# Patient Record
Sex: FEMALE | Race: WHITE | NOT HISPANIC OR LATINO | Employment: STUDENT | URBAN - METROPOLITAN AREA
[De-identification: names, ages, dates, MRNs, and addresses within clinical notes are randomized per-mention and may not be internally consistent; named-entity substitution may affect disease eponyms.]

---
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
## 2024-01-05 ENCOUNTER — LAB (OUTPATIENT)
Dept: LAB | Facility: LAB | Age: 19
End: 2024-01-05
Payer: COMMERCIAL

## 2024-01-05 DIAGNOSIS — R79.0 LOW SERUM FERRITIN LEVEL: ICD-10-CM

## 2024-01-05 DIAGNOSIS — F43.23 ADJUSTMENT REACTION WITH ANXIETY AND DEPRESSION: ICD-10-CM

## 2024-01-05 DIAGNOSIS — E03.9 HYPOTHYROIDISM, UNSPECIFIED TYPE: ICD-10-CM

## 2024-01-05 LAB
ALBUMIN SERPL BCP-MCNC: 4.7 G/DL (ref 3.4–5)
ALP SERPL-CCNC: 49 U/L (ref 33–110)
ALT SERPL W P-5'-P-CCNC: 8 U/L (ref 7–45)
ANION GAP SERPL CALC-SCNC: 10 MMOL/L (ref 10–20)
AST SERPL W P-5'-P-CCNC: 12 U/L (ref 9–39)
BASOPHILS # BLD AUTO: 0.06 X10*3/UL (ref 0–0.1)
BASOPHILS NFR BLD AUTO: 1.5 %
BILIRUB SERPL-MCNC: 0.5 MG/DL (ref 0–1.2)
BUN SERPL-MCNC: 8 MG/DL (ref 6–23)
CALCIUM SERPL-MCNC: 9.3 MG/DL (ref 8.6–10.3)
CHLORIDE SERPL-SCNC: 103 MMOL/L (ref 98–107)
CO2 SERPL-SCNC: 28 MMOL/L (ref 21–32)
CREAT SERPL-MCNC: 0.74 MG/DL (ref 0.5–1.05)
EOSINOPHIL # BLD AUTO: 0.11 X10*3/UL (ref 0–0.7)
EOSINOPHIL NFR BLD AUTO: 2.7 %
ERYTHROCYTE [DISTWIDTH] IN BLOOD BY AUTOMATED COUNT: 12.7 % (ref 11.5–14.5)
GFR SERPL CREATININE-BSD FRML MDRD: >90 ML/MIN/1.73M*2
GLUCOSE SERPL-MCNC: 79 MG/DL (ref 74–99)
HCT VFR BLD AUTO: 43.5 % (ref 36–46)
HGB BLD-MCNC: 14.7 G/DL (ref 12–16)
IMM GRANULOCYTES # BLD AUTO: 0.02 X10*3/UL (ref 0–0.7)
IMM GRANULOCYTES NFR BLD AUTO: 0.5 % (ref 0–0.9)
LYMPHOCYTES # BLD AUTO: 1.84 X10*3/UL (ref 1.2–4.8)
LYMPHOCYTES NFR BLD AUTO: 44.7 %
MCH RBC QN AUTO: 30.5 PG (ref 26–34)
MCHC RBC AUTO-ENTMCNC: 33.8 G/DL (ref 32–36)
MCV RBC AUTO: 90 FL (ref 80–100)
MONOCYTES # BLD AUTO: 0.36 X10*3/UL (ref 0.1–1)
MONOCYTES NFR BLD AUTO: 8.7 %
NEUTROPHILS # BLD AUTO: 1.73 X10*3/UL (ref 1.2–7.7)
NEUTROPHILS NFR BLD AUTO: 41.9 %
NRBC BLD-RTO: 0 /100 WBCS (ref 0–0)
PLATELET # BLD AUTO: 247 X10*3/UL (ref 150–450)
POTASSIUM SERPL-SCNC: 4 MMOL/L (ref 3.5–5.3)
PROT SERPL-MCNC: 7.5 G/DL (ref 6.4–8.2)
RBC # BLD AUTO: 4.82 X10*6/UL (ref 4–5.2)
SODIUM SERPL-SCNC: 137 MMOL/L (ref 136–145)
T4 FREE SERPL-MCNC: 0.82 NG/DL (ref 0.61–1.12)
TSH SERPL-ACNC: 1.26 MIU/L (ref 0.44–3.98)
WBC # BLD AUTO: 4.1 X10*3/UL (ref 4.4–11.3)

## 2024-01-05 PROCEDURE — 84443 ASSAY THYROID STIM HORMONE: CPT

## 2024-01-05 PROCEDURE — 80053 COMPREHEN METABOLIC PANEL: CPT

## 2024-01-05 PROCEDURE — 85025 COMPLETE CBC W/AUTO DIFF WBC: CPT

## 2024-01-05 PROCEDURE — 84439 ASSAY OF FREE THYROXINE: CPT

## 2024-01-05 PROCEDURE — 36415 COLL VENOUS BLD VENIPUNCTURE: CPT

## 2024-01-12 ENCOUNTER — OFFICE VISIT (OUTPATIENT)
Dept: PRIMARY CARE | Facility: CLINIC | Age: 19
End: 2024-01-12
Payer: COMMERCIAL

## 2024-01-12 VITALS
WEIGHT: 158 LBS | HEIGHT: 66 IN | TEMPERATURE: 97.4 F | RESPIRATION RATE: 16 BRPM | SYSTOLIC BLOOD PRESSURE: 106 MMHG | OXYGEN SATURATION: 97 % | DIASTOLIC BLOOD PRESSURE: 64 MMHG | BODY MASS INDEX: 25.39 KG/M2 | HEART RATE: 79 BPM

## 2024-01-12 DIAGNOSIS — R11.0 POSTPRANDIAL NAUSEA: Primary | ICD-10-CM

## 2024-01-12 DIAGNOSIS — E03.9 HYPOTHYROIDISM, UNSPECIFIED TYPE: ICD-10-CM

## 2024-01-12 DIAGNOSIS — F43.23 ADJUSTMENT REACTION WITH ANXIETY AND DEPRESSION: ICD-10-CM

## 2024-01-12 DIAGNOSIS — K58.2 IRRITABLE BOWEL SYNDROME WITH BOTH CONSTIPATION AND DIARRHEA: ICD-10-CM

## 2024-01-12 DIAGNOSIS — R10.84 GENERALIZED ABDOMINAL PAIN: ICD-10-CM

## 2024-01-12 DIAGNOSIS — R79.0 LOW SERUM FERRITIN LEVEL: ICD-10-CM

## 2024-01-12 LAB
POC APPEARANCE, URINE: CLEAR
POC BILIRUBIN, URINE: NEGATIVE
POC BLOOD, URINE: NEGATIVE
POC COLOR, URINE: YELLOW
POC GLUCOSE, URINE: NEGATIVE MG/DL
POC KETONES, URINE: NEGATIVE MG/DL
POC LEUKOCYTES, URINE: NEGATIVE
POC NITRITE,URINE: NEGATIVE
POC PH, URINE: 5.5 PH
POC PROTEIN, URINE: NEGATIVE MG/DL
POC SPECIFIC GRAVITY, URINE: 1.02
POC UROBILINOGEN, URINE: 0.2 EU/DL

## 2024-01-12 PROCEDURE — 99213 OFFICE O/P EST LOW 20 MIN: CPT | Performed by: FAMILY MEDICINE

## 2024-01-12 PROCEDURE — 1036F TOBACCO NON-USER: CPT | Performed by: FAMILY MEDICINE

## 2024-01-12 PROCEDURE — 81003 URINALYSIS AUTO W/O SCOPE: CPT | Performed by: FAMILY MEDICINE

## 2024-01-12 RX ORDER — SERTRALINE HYDROCHLORIDE 25 MG/1
25 TABLET, FILM COATED ORAL DAILY
Qty: 30 TABLET | Refills: 5 | Status: SHIPPED | OUTPATIENT
Start: 2024-01-12 | End: 2024-01-26 | Stop reason: SDUPTHER

## 2024-01-12 RX ORDER — DICYCLOMINE HYDROCHLORIDE 10 MG/1
10 CAPSULE ORAL 4 TIMES DAILY PRN
Qty: 30 CAPSULE | Refills: 3 | Status: SHIPPED | OUTPATIENT
Start: 2024-01-12 | End: 2024-03-12

## 2024-01-12 NOTE — PROGRESS NOTES
"Subjective   Patient ID: Julia Clements \"Dede\" is a 18 y.o. female who presents for GI Problem (Nausea, vomiting, diarrhea after eating--worsening x 6 weeks).  Covid vax: x 2  Flu: UTD  Has had gardasil     Lmp: 4 weeks ago     HPI  Patient Active Problem List   Diagnosis    Eczema    Hypothyroidism    Low serum ferritin level       Past Surgical History:   Procedure Laterality Date    NO PAST SURGERIES         Review of Systems no sz mi or cva    This patient has   NO history of recent Covid nor flu symptoms,  NO Fever nor chills,  NO Chest pain, shortness of breath nor paroxysmal nocturnal dyspnea,  + Nausea, vomiting, occ diarrhea,  NO Hematochezia nor melena,  NO Dysuria, hematuria, nor new incontinence issues  NO new severe headaches nor neurological complaints,  NO new issues with anxiety nor depression nor new psychiatric complaints,  NO suicidal nor homicidal ideations.     OBJECTIVE:  /64   Pulse 79   Temp 36.3 °C (97.4 °F) (Temporal)   Resp 16   Ht 1.676 m (5' 6\")   Wt 71.7 kg (158 lb)   LMP 12/15/2023 (Approximate)   SpO2 97%   BMI 25.50 kg/m²      General:  alert, oriented, no acute distress.  No obvious skin rashes noted.   No gait disturbance noted.    Mood is pleasant, not tearful, no signs of emotional distress.  Not appearing intoxicated or altered.   No voiced delusions,   Normal, appropriate behavior.    HEENT: Normocephalic, atraumatic,   Pupils round, reactive to light  Extraocular motions intact and wnl  Tympanic membranes normal    Neck: no nuchal rigidity  No masses palpable.  No carotid bruits.  No thyromegaly.    Respiratory: Equal breath sounds  No wheezes,    rales,    nor rhonchi  No respiratory distress.    Heart: Regular rate and rhythm, no    murmurs  no rubs/gallops    Abdomen: no masses palpable, nontender, no rebound nor guarding.    Extremities: NO cyanosis noted, no clubbing.   No edema noted.  2+dorsalis pedis pulses.    Normal-not antalgic, steady " gait.    Office Visit on 01/12/2024   Component Date Value Ref Range Status    POC Color, Urine 01/12/2024 Yellow  Straw, Yellow, Light-Yellow Final    POC Appearance, Urine 01/12/2024 Clear  Clear Final    POC Glucose, Urine 01/12/2024 NEGATIVE  NEGATIVE mg/dl Final    POC Bilirubin, Urine 01/12/2024 NEGATIVE  NEGATIVE Final    POC Ketones, Urine 01/12/2024 NEGATIVE  NEGATIVE mg/dl Final    POC Specific Gravity, Urine 01/12/2024 1.020  1.005 - 1.035 Final    POC Blood, Urine 01/12/2024 NEGATIVE  NEGATIVE Final    POC PH, Urine 01/12/2024 5.5  No Reference Range Established PH Final    POC Protein, Urine 01/12/2024 NEGATIVE  NEGATIVE, 30 (1+) mg/dl Final    POC Urobilinogen, Urine 01/12/2024 0.2  0.2, 1.0 EU/DL Final    Poc Nitrite, Urine 01/12/2024 NEGATIVE  NEGATIVE Final    POC Leukocytes, Urine 01/12/2024 NEGATIVE  NEGATIVE Final   Lab on 01/05/2024   Component Date Value Ref Range Status    WBC 01/05/2024 4.1 (L)  4.4 - 11.3 x10*3/uL Final    nRBC 01/05/2024 0.0  0.0 - 0.0 /100 WBCs Final    RBC 01/05/2024 4.82  4.00 - 5.20 x10*6/uL Final    Hemoglobin 01/05/2024 14.7  12.0 - 16.0 g/dL Final    Hematocrit 01/05/2024 43.5  36.0 - 46.0 % Final    MCV 01/05/2024 90  80 - 100 fL Final    MCH 01/05/2024 30.5  26.0 - 34.0 pg Final    MCHC 01/05/2024 33.8  32.0 - 36.0 g/dL Final    RDW 01/05/2024 12.7  11.5 - 14.5 % Final    Platelets 01/05/2024 247  150 - 450 x10*3/uL Final    Neutrophils % 01/05/2024 41.9  40.0 - 80.0 % Final    Immature Granulocytes %, Automated 01/05/2024 0.5  0.0 - 0.9 % Final    Immature Granulocyte Count (IG) includes promyelocytes, myelocytes and metamyelocytes but does not include bands. Percent differential counts (%) should be interpreted in the context of the absolute cell counts (cells/UL).    Lymphocytes % 01/05/2024 44.7  13.0 - 44.0 % Final    Monocytes % 01/05/2024 8.7  2.0 - 10.0 % Final    Eosinophils % 01/05/2024 2.7  0.0 - 6.0 % Final    Basophils % 01/05/2024 1.5  0.0 - 2.0 %  Final    Neutrophils Absolute 01/05/2024 1.73  1.20 - 7.70 x10*3/uL Final    Percent differential counts (%) should be interpreted in the context of the absolute cell counts (cells/uL).    Immature Granulocytes Absolute, Au* 01/05/2024 0.02  0.00 - 0.70 x10*3/uL Final    Lymphocytes Absolute 01/05/2024 1.84  1.20 - 4.80 x10*3/uL Final    Monocytes Absolute 01/05/2024 0.36  0.10 - 1.00 x10*3/uL Final    Eosinophils Absolute 01/05/2024 0.11  0.00 - 0.70 x10*3/uL Final    Basophils Absolute 01/05/2024 0.06  0.00 - 0.10 x10*3/uL Final    Glucose 01/05/2024 79  74 - 99 mg/dL Final    Sodium 01/05/2024 137  136 - 145 mmol/L Final    Potassium 01/05/2024 4.0  3.5 - 5.3 mmol/L Final    Chloride 01/05/2024 103  98 - 107 mmol/L Final    Bicarbonate 01/05/2024 28  21 - 32 mmol/L Final    Anion Gap 01/05/2024 10  10 - 20 mmol/L Final    Urea Nitrogen 01/05/2024 8  6 - 23 mg/dL Final    Creatinine 01/05/2024 0.74  0.50 - 1.05 mg/dL Final    eGFR 01/05/2024 >90  >60 mL/min/1.73m*2 Final    Calculations of estimated GFR are performed using the 2021 CKD-EPI Study Refit equation without the race variable for the IDMS-Traceable creatinine methods.  https://jasn.asnjournals.org/content/early/2021/09/22/ASN.3520569271    Calcium 01/05/2024 9.3  8.6 - 10.3 mg/dL Final    Albumin 01/05/2024 4.7  3.4 - 5.0 g/dL Final    Alkaline Phosphatase 01/05/2024 49  33 - 110 U/L Final    Total Protein 01/05/2024 7.5  6.4 - 8.2 g/dL Final    AST 01/05/2024 12  9 - 39 U/L Final    Bilirubin, Total 01/05/2024 0.5  0.0 - 1.2 mg/dL Final    ALT 01/05/2024 8  7 - 45 U/L Final    Patients treated with Sulfasalazine may generate falsely decreased results for ALT.    Thyroid Stimulating Hormone 01/05/2024 1.26  0.44 - 3.98 mIU/L Final    Thyroxine, Free 01/05/2024 0.82  0.61 - 1.12 ng/dL Final        Assessment/Plan     Problem List Items Addressed This Visit       Hypothyroidism    Low serum ferritin level     Other Visit Diagnoses       Postprandial nausea     -  Primary    Relevant Orders    POCT UA Automated manually resulted (Completed)    US right upper quadrant    Generalized abdominal pain        Relevant Orders    US right upper quadrant    Irritable bowel syndrome with both constipation and diarrhea        Relevant Medications    dicyclomine (Bentyl) 10 mg capsule          No recent abtc  Ultrasound abd  See me 2-4wks  To er if worse pain    To gi if issues persists  Could be ibs    Fiber may help and bentyl for cramping .efrem Caroft helpful  O hi/si   Not in crisis has plan for er if occurs

## 2024-01-13 ENCOUNTER — ANCILLARY PROCEDURE (OUTPATIENT)
Dept: RADIOLOGY | Facility: CLINIC | Age: 19
End: 2024-01-13
Payer: COMMERCIAL

## 2024-01-13 ENCOUNTER — APPOINTMENT (OUTPATIENT)
Dept: RADIOLOGY | Facility: CLINIC | Age: 19
End: 2024-01-13
Payer: COMMERCIAL

## 2024-01-13 DIAGNOSIS — R10.84 GENERALIZED ABDOMINAL PAIN: ICD-10-CM

## 2024-01-13 DIAGNOSIS — R11.0 POSTPRANDIAL NAUSEA: ICD-10-CM

## 2024-01-13 PROCEDURE — 76705 ECHO EXAM OF ABDOMEN: CPT

## 2024-01-13 PROCEDURE — 76705 ECHO EXAM OF ABDOMEN: CPT | Performed by: RADIOLOGY

## 2024-01-19 ENCOUNTER — APPOINTMENT (OUTPATIENT)
Dept: RADIOLOGY | Facility: CLINIC | Age: 19
End: 2024-01-19
Payer: COMMERCIAL

## 2024-01-26 ENCOUNTER — SOCIAL WORK (OUTPATIENT)
Dept: PRIMARY CARE | Facility: CLINIC | Age: 19
End: 2024-01-26
Payer: COMMERCIAL

## 2024-01-26 ENCOUNTER — TELEPHONE (OUTPATIENT)
Dept: PRIMARY CARE | Facility: CLINIC | Age: 19
End: 2024-01-26

## 2024-01-26 DIAGNOSIS — R79.0 LOW SERUM FERRITIN LEVEL: ICD-10-CM

## 2024-01-26 DIAGNOSIS — F43.23 ADJUSTMENT REACTION WITH ANXIETY AND DEPRESSION: ICD-10-CM

## 2024-01-26 DIAGNOSIS — F51.01 PRIMARY INSOMNIA: Primary | ICD-10-CM

## 2024-01-26 DIAGNOSIS — E03.9 HYPOTHYROIDISM, UNSPECIFIED TYPE: ICD-10-CM

## 2024-01-26 RX ORDER — TRAZODONE HYDROCHLORIDE 50 MG/1
50 TABLET ORAL NIGHTLY PRN
Qty: 90 TABLET | Refills: 1 | Status: SHIPPED | OUTPATIENT
Start: 2024-01-26 | End: 2025-01-25

## 2024-01-26 RX ORDER — SERTRALINE HYDROCHLORIDE 50 MG/1
50 TABLET, FILM COATED ORAL DAILY
Qty: 90 TABLET | Refills: 1 | Status: SHIPPED | OUTPATIENT
Start: 2024-01-26 | End: 2024-07-24

## 2024-01-26 ASSESSMENT — ANXIETY QUESTIONNAIRES
3. WORRYING TOO MUCH ABOUT DIFFERENT THINGS: SEVERAL DAYS
IF YOU CHECKED OFF ANY PROBLEMS ON THIS QUESTIONNAIRE, HOW DIFFICULT HAVE THESE PROBLEMS MADE IT FOR YOU TO DO YOUR WORK, TAKE CARE OF THINGS AT HOME, OR GET ALONG WITH OTHER PEOPLE: SOMEWHAT DIFFICULT
2. NOT BEING ABLE TO STOP OR CONTROL WORRYING: MORE THAN HALF THE DAYS
4. TROUBLE RELAXING: MORE THAN HALF THE DAYS
5. BEING SO RESTLESS THAT IT IS HARD TO SIT STILL: SEVERAL DAYS
1. FEELING NERVOUS, ANXIOUS, OR ON EDGE: MORE THAN HALF THE DAYS
6. BECOMING EASILY ANNOYED OR IRRITABLE: MORE THAN HALF THE DAYS
7. FEELING AFRAID AS IF SOMETHING AWFUL MIGHT HAPPEN: SEVERAL DAYS
GAD7 TOTAL SCORE: 11

## 2024-01-26 ASSESSMENT — PATIENT HEALTH QUESTIONNAIRE - PHQ9
5. POOR APPETITE OR OVEREATING: MORE THAN HALF THE DAYS
4. FEELING TIRED OR HAVING LITTLE ENERGY: MORE THAN HALF THE DAYS
6. FEELING BAD ABOUT YOURSELF - OR THAT YOU ARE A FAILURE OR HAVE LET YOURSELF OR YOUR FAMILY DOWN: MORE THAN HALF THE DAYS
9. THOUGHTS THAT YOU WOULD BE BETTER OFF DEAD, OR OF HURTING YOURSELF: NOT AT ALL
2. FEELING DOWN, DEPRESSED OR HOPELESS: SEVERAL DAYS
SUM OF ALL RESPONSES TO PHQ9 QUESTIONS 1 & 2: 3
8. MOVING OR SPEAKING SO SLOWLY THAT OTHER PEOPLE COULD HAVE NOTICED. OR THE OPPOSITE, BEING SO FIGETY OR RESTLESS THAT YOU HAVE BEEN MOVING AROUND A LOT MORE THAN USUAL: MORE THAN HALF THE DAYS
1. LITTLE INTEREST OR PLEASURE IN DOING THINGS: MORE THAN HALF THE DAYS
3. TROUBLE FALLING OR STAYING ASLEEP: NEARLY EVERY DAY
10. IF YOU CHECKED OFF ANY PROBLEMS, HOW DIFFICULT HAVE THESE PROBLEMS MADE IT FOR YOU TO DO YOUR WORK, TAKE CARE OF THINGS AT HOME, OR GET ALONG WITH OTHER PEOPLE: SOMEWHAT DIFFICULT
7. TROUBLE CONCENTRATING ON THINGS, SUCH AS READING THE NEWSPAPER OR WATCHING TELEVISION: NEARLY EVERY DAY
SUM OF ALL RESPONSES TO PHQ QUESTIONS 1-9: 17

## 2024-01-26 NOTE — PROGRESS NOTES
Patient is requesting to increase her Sertraline 25mg to 50mg. Has seen a response however continues to have anxiety attacks and depressive episodes. Shared her sleep remains poor. Requested to Try Trazodone 50mg PRN. Please advise.     Recommendations:   Can increase to 50mg daily, and can increase by 50mg daily every 4-8 weeks after that to peak dose of 200mg  If depression improves, but sleep still an issue, can add trazodone 50mg at time as needed  Cont psychotherapy

## 2024-01-26 NOTE — PROGRESS NOTES
[facility] ([facility])  Progress Note    Type of Interaction: In Office    Start Time: 10:06am    End Time: 10:56am    Appointment: Not Scheduled    Reason for Visit:   Chief Complaint   Patient presents with    Depression    Anxiety      Interval History / Patient Symptoms:     Patient Health Questionnaire-9 Score: 17 (1/26/2024 11:00 AM)  MICHAEL-7 Total Score: 11 (1/26/2024 11:01 AM)    Interventions Provided: [place] Setting, Psychoeducation, Acceptance & Commitment Therapy, Motivational Interviewing, Strengths Exploration, Values Exploration, Communication Training, Review Progress/Goals Stress Management, Mindfulness, and Treatment Planning    Progress Made: Minimum    Response to Intervention: Patient has been taking Sertraline 25mg for about six weeks. Completed PHQ/MICHAEL today with no change in either screener. Patient did share, “when I'm on it I don't spiral as fast, I still do somedays, but not as often”. For this reason, reviewed recs and patient was on board with increasing her medication to 50mg. Patient also shared trouble sleeping, requested to try Trazodone PRN. Patient shared that she requested to go PRN at her job, struggling with getting this approved. Had a difficult time with her classes last semester and would like to focus more on school. Facing some challenges with her partner. Also, continues to moreira with her emotions related to her mother. Patient shared a lack of motivation to go to the gym. Spends most of there free time in bed. Writer encouraged nourishing meaningful relationships, movement, and overall increased self care to maintain mental health stability.     Plan: Reassess mood at next appt three weeks on med increase    Patient Instructions   Patient is scheduled for in person appt 2/16 @ 11am.     Follow Up / Next Appointment: Next appointment: 02/16/24

## 2024-01-31 ENCOUNTER — DOCUMENTATION (OUTPATIENT)
Dept: PRIMARY CARE | Facility: CLINIC | Age: 19
End: 2024-01-31
Payer: COMMERCIAL

## 2024-01-31 DIAGNOSIS — F43.10 POST TRAUMATIC STRESS DISORDER (PTSD): Primary | ICD-10-CM

## 2024-01-31 PROCEDURE — 99493 SBSQ PSYC COLLAB CARE MGMT: CPT | Performed by: FAMILY MEDICINE

## 2024-02-16 ENCOUNTER — SOCIAL WORK (OUTPATIENT)
Dept: PRIMARY CARE | Facility: CLINIC | Age: 19
End: 2024-02-16
Payer: COMMERCIAL

## 2024-02-16 ENCOUNTER — TELEPHONE (OUTPATIENT)
Dept: PRIMARY CARE | Facility: CLINIC | Age: 19
End: 2024-02-16

## 2024-02-16 DIAGNOSIS — E03.9 HYPOTHYROIDISM, UNSPECIFIED TYPE: ICD-10-CM

## 2024-02-16 DIAGNOSIS — R79.0 LOW SERUM FERRITIN LEVEL: ICD-10-CM

## 2024-02-16 DIAGNOSIS — F43.23 ADJUSTMENT REACTION WITH ANXIETY AND DEPRESSION: ICD-10-CM

## 2024-02-16 NOTE — PROGRESS NOTES
"[facility] ([facility])  Progress Note    Type of Interaction: In Office    Start Time: 11:00am    End Time: 11:55am    Appointment: Not Scheduled    Reason for Visit:   Chief Complaint   Patient presents with    Depression    Anxiety      Interval History / Patient Symptoms:     Patient endorsed improvement in mood, continues to have daily anxiety attacks (less severe).     Interventions Provided: [place] Setting, Psychoeducation, Acceptance & Commitment Therapy, Motivational Interviewing, Strengths Exploration, Values Exploration, Develop Coping Strategies, Review Progress/Goals Stress Management, Mindfulness, Treatment Planning, and IPT    Progress Made: Moderate    Response to Intervention: Patient shared that she feels as if the med increase is helping, continues to have daily anxiety attacks that are less severe. Additional stressors at work and in her relationship. Feels her coursework is going well. Problem solved communication techniques and distraction techniques. Reviewed communication skills, \"I\" statements. Patient shared she feels as if she is giving more into her relationship than her partner, reported that she tends to date partners that his similar emotional numbing to her mother. Encouraged exploring her values in terms of relationships/partners. Writer encouraged nourishing meaningful relationships, movement, healthy balanced nutrition, and overall increased self care to maintain mental health stability.     Plan: Requested med increase, patient is going to switch medication to nighttime as well due to some nausea from the medication. Encouraged to eat with medication. Patient has yet to try Trazodone PRN (fears of not waking up). PHQ/MICHAEL at next appt to evaluate progress.     Patient Instructions   Patient is scheduled for in person follow up on 3/8 @ 10am    Follow Up / Next Appointment: Next appointment: 03/08/24      "

## 2024-02-16 NOTE — PROGRESS NOTES
Patient is requesting to increase her Sertraline 50mg, has been on 50mg for a few weeks now. Has seen a positive response however continues to have anxiety attacks and depressive episodes.     Please advise,     Recommendations:   Can increase to 50mg daily, and can increase by 50mg daily every 4-8 weeks after that to peak dose of 200mg

## 2024-02-19 RX ORDER — SERTRALINE HYDROCHLORIDE 100 MG/1
100 TABLET, FILM COATED ORAL DAILY
Qty: 90 TABLET | Refills: 1 | Status: SHIPPED | OUTPATIENT
Start: 2024-02-19

## 2024-02-28 ENCOUNTER — DOCUMENTATION (OUTPATIENT)
Dept: PRIMARY CARE | Facility: CLINIC | Age: 19
End: 2024-02-28
Payer: COMMERCIAL

## 2024-02-28 DIAGNOSIS — F43.10 POST TRAUMATIC STRESS DISORDER: Primary | ICD-10-CM

## 2024-02-28 PROCEDURE — 99493 SBSQ PSYC COLLAB CARE MGMT: CPT | Performed by: FAMILY MEDICINE

## 2024-03-07 ENCOUNTER — TELEPHONE (OUTPATIENT)
Dept: PRIMARY CARE | Facility: CLINIC | Age: 19
End: 2024-03-07
Payer: COMMERCIAL

## 2024-03-08 ENCOUNTER — APPOINTMENT (OUTPATIENT)
Dept: PRIMARY CARE | Facility: CLINIC | Age: 19
End: 2024-03-08
Payer: COMMERCIAL

## 2024-03-15 ENCOUNTER — SOCIAL WORK (OUTPATIENT)
Dept: PRIMARY CARE | Facility: CLINIC | Age: 19
End: 2024-03-15
Payer: COMMERCIAL

## 2024-03-15 ASSESSMENT — ANXIETY QUESTIONNAIRES
2. NOT BEING ABLE TO STOP OR CONTROL WORRYING: SEVERAL DAYS
6. BECOMING EASILY ANNOYED OR IRRITABLE: SEVERAL DAYS
5. BEING SO RESTLESS THAT IT IS HARD TO SIT STILL: NOT AT ALL
4. TROUBLE RELAXING: NOT AT ALL
7. FEELING AFRAID AS IF SOMETHING AWFUL MIGHT HAPPEN: NOT AT ALL
GAD7 TOTAL SCORE: 4
IF YOU CHECKED OFF ANY PROBLEMS ON THIS QUESTIONNAIRE, HOW DIFFICULT HAVE THESE PROBLEMS MADE IT FOR YOU TO DO YOUR WORK, TAKE CARE OF THINGS AT HOME, OR GET ALONG WITH OTHER PEOPLE: SOMEWHAT DIFFICULT
3. WORRYING TOO MUCH ABOUT DIFFERENT THINGS: SEVERAL DAYS
1. FEELING NERVOUS, ANXIOUS, OR ON EDGE: SEVERAL DAYS

## 2024-03-15 ASSESSMENT — PATIENT HEALTH QUESTIONNAIRE - PHQ9
5. POOR APPETITE OR OVEREATING: SEVERAL DAYS
9. THOUGHTS THAT YOU WOULD BE BETTER OFF DEAD, OR OF HURTING YOURSELF: NOT AT ALL
2. FEELING DOWN, DEPRESSED OR HOPELESS: SEVERAL DAYS
SUM OF ALL RESPONSES TO PHQ9 QUESTIONS 1 & 2: 2
8. MOVING OR SPEAKING SO SLOWLY THAT OTHER PEOPLE COULD HAVE NOTICED. OR THE OPPOSITE, BEING SO FIGETY OR RESTLESS THAT YOU HAVE BEEN MOVING AROUND A LOT MORE THAN USUAL: NOT AT ALL
SUM OF ALL RESPONSES TO PHQ QUESTIONS 1-9: 7
10. IF YOU CHECKED OFF ANY PROBLEMS, HOW DIFFICULT HAVE THESE PROBLEMS MADE IT FOR YOU TO DO YOUR WORK, TAKE CARE OF THINGS AT HOME, OR GET ALONG WITH OTHER PEOPLE: SOMEWHAT DIFFICULT
1. LITTLE INTEREST OR PLEASURE IN DOING THINGS: SEVERAL DAYS
4. FEELING TIRED OR HAVING LITTLE ENERGY: SEVERAL DAYS
3. TROUBLE FALLING OR STAYING ASLEEP: SEVERAL DAYS
6. FEELING BAD ABOUT YOURSELF - OR THAT YOU ARE A FAILURE OR HAVE LET YOURSELF OR YOUR FAMILY DOWN: SEVERAL DAYS
7. TROUBLE CONCENTRATING ON THINGS, SUCH AS READING THE NEWSPAPER OR WATCHING TELEVISION: SEVERAL DAYS

## 2024-03-15 NOTE — PROGRESS NOTES
[facility] ([facility])  Progress Note    Type of Interaction: In Office    Start Time: 9:30am    End Time: 10:29am    Appointment: Not Scheduled    Reason for Visit:   Chief Complaint   Patient presents with    Depression    Anxiety      Interval History / Patient Symptoms:     Patient Health Questionnaire-9 Score: 7 (3/15/2024 10:32 AM)  MICHAEL-7 Total Score: 4 (3/15/2024 10:32 AM)    Interventions Provided: [place] Setting, Acceptance & Commitment Therapy, Motivational Interviewing, Strengths Exploration, Values Exploration, Communication Training, Review Progress/Goals Stress Management, Mindfulness, and Treatment Planning    Progress Made: Significant    Response to Intervention: Patient shared that she is feeling much more productive, hours at work have not dropped down to PRN yet. Continues to face some anxiety at work. Patient reported a decrease in appetite, does not believe she has lost weight. Encouraged keeping snacks and small meals handy. Reported taking the trazodone and slept very well, felt productive the next day, more focused. Processed through and correlated sleep=productivity. Patient reported she and her partner Carroll are back together, communication slowly improving. Both of them have issues with each other's mothers. Processed through trauma, grief, and acceptance. Ways of regaining trust. Writer encouraged nourishing meaningful relationships, movement, healthy balanced nutrition, and overall increased self-care to maintain mental health stability.  Significant improvement in PHQ/MICHAEL screeners, increase dose has helped immensely.     Plan: Continue to monitor mood, relapse prevention planning if stability continues.     Patient Instructions   Patient is scheduled for in person follow up on 4/12 @ 8am    Follow Up / Next Appointment: Next appointment: 04/12/24

## 2024-03-18 ENCOUNTER — APPOINTMENT (OUTPATIENT)
Dept: PRIMARY CARE | Facility: CLINIC | Age: 19
End: 2024-03-18
Payer: COMMERCIAL

## 2024-03-19 ENCOUNTER — APPOINTMENT (OUTPATIENT)
Dept: PRIMARY CARE | Facility: CLINIC | Age: 19
End: 2024-03-19
Payer: COMMERCIAL

## 2024-03-20 DIAGNOSIS — R53.83 OTHER FATIGUE: ICD-10-CM

## 2024-03-20 DIAGNOSIS — K59.00 ACUTE CONSTIPATION: ICD-10-CM

## 2024-03-20 DIAGNOSIS — E03.9 HYPOTHYROIDISM, UNSPECIFIED TYPE: Primary | ICD-10-CM

## 2024-03-21 ENCOUNTER — LAB (OUTPATIENT)
Dept: LAB | Facility: LAB | Age: 19
End: 2024-03-21
Payer: COMMERCIAL

## 2024-03-21 DIAGNOSIS — E03.9 HYPOTHYROIDISM, UNSPECIFIED TYPE: ICD-10-CM

## 2024-03-21 DIAGNOSIS — K59.00 ACUTE CONSTIPATION: ICD-10-CM

## 2024-03-21 DIAGNOSIS — R53.83 OTHER FATIGUE: ICD-10-CM

## 2024-03-21 LAB
ALBUMIN SERPL BCP-MCNC: 4.6 G/DL (ref 3.4–5)
ALP SERPL-CCNC: 50 U/L (ref 33–110)
ALT SERPL W P-5'-P-CCNC: 10 U/L (ref 7–45)
ANION GAP SERPL CALC-SCNC: 12 MMOL/L (ref 10–20)
AST SERPL W P-5'-P-CCNC: 14 U/L (ref 9–39)
B-HCG SERPL-ACNC: <2 MIU/ML
BASOPHILS # BLD AUTO: 0.07 X10*3/UL (ref 0–0.1)
BASOPHILS NFR BLD AUTO: 1.9 %
BILIRUB SERPL-MCNC: 0.4 MG/DL (ref 0–1.2)
BUN SERPL-MCNC: 11 MG/DL (ref 6–23)
CALCIUM SERPL-MCNC: 9.6 MG/DL (ref 8.6–10.3)
CHLORIDE SERPL-SCNC: 105 MMOL/L (ref 98–107)
CO2 SERPL-SCNC: 27 MMOL/L (ref 21–32)
CREAT SERPL-MCNC: 0.77 MG/DL (ref 0.5–1.05)
EGFRCR SERPLBLD CKD-EPI 2021: >90 ML/MIN/1.73M*2
EOSINOPHIL # BLD AUTO: 0.09 X10*3/UL (ref 0–0.7)
EOSINOPHIL NFR BLD AUTO: 2.4 %
ERYTHROCYTE [DISTWIDTH] IN BLOOD BY AUTOMATED COUNT: 12.8 % (ref 11.5–14.5)
EST. AVERAGE GLUCOSE BLD GHB EST-MCNC: 80 MG/DL
FERRITIN SERPL-MCNC: 27 NG/ML (ref 8–150)
GLUCOSE SERPL-MCNC: 81 MG/DL (ref 74–99)
HBA1C MFR BLD: 4.4 %
HCT VFR BLD AUTO: 43.6 % (ref 36–46)
HGB BLD-MCNC: 14.5 G/DL (ref 12–16)
IMM GRANULOCYTES # BLD AUTO: 0 X10*3/UL (ref 0–0.7)
IMM GRANULOCYTES NFR BLD AUTO: 0 % (ref 0–0.9)
LYMPHOCYTES # BLD AUTO: 1.69 X10*3/UL (ref 1.2–4.8)
LYMPHOCYTES NFR BLD AUTO: 45.4 %
MCH RBC QN AUTO: 30.7 PG (ref 26–34)
MCHC RBC AUTO-ENTMCNC: 33.3 G/DL (ref 32–36)
MCV RBC AUTO: 92 FL (ref 80–100)
MONOCYTES # BLD AUTO: 0.29 X10*3/UL (ref 0.1–1)
MONOCYTES NFR BLD AUTO: 7.8 %
NEUTROPHILS # BLD AUTO: 1.58 X10*3/UL (ref 1.2–7.7)
NEUTROPHILS NFR BLD AUTO: 42.5 %
NRBC BLD-RTO: 0 /100 WBCS (ref 0–0)
PLATELET # BLD AUTO: 283 X10*3/UL (ref 150–450)
POTASSIUM SERPL-SCNC: 4.9 MMOL/L (ref 3.5–5.3)
PROT SERPL-MCNC: 7.4 G/DL (ref 6.4–8.2)
RBC # BLD AUTO: 4.73 X10*6/UL (ref 4–5.2)
SODIUM SERPL-SCNC: 139 MMOL/L (ref 136–145)
T4 FREE SERPL-MCNC: 0.64 NG/DL (ref 0.61–1.12)
TSH SERPL-ACNC: 1.7 MIU/L (ref 0.44–3.98)
VIT B12 SERPL-MCNC: 357 PG/ML (ref 211–911)
WBC # BLD AUTO: 3.7 X10*3/UL (ref 4.4–11.3)

## 2024-03-21 PROCEDURE — 36415 COLL VENOUS BLD VENIPUNCTURE: CPT

## 2024-03-21 PROCEDURE — 82728 ASSAY OF FERRITIN: CPT

## 2024-03-21 PROCEDURE — 80053 COMPREHEN METABOLIC PANEL: CPT

## 2024-03-21 PROCEDURE — 84702 CHORIONIC GONADOTROPIN TEST: CPT

## 2024-03-21 PROCEDURE — 82652 VIT D 1 25-DIHYDROXY: CPT

## 2024-03-21 PROCEDURE — 82607 VITAMIN B-12: CPT

## 2024-03-21 PROCEDURE — 84439 ASSAY OF FREE THYROXINE: CPT

## 2024-03-21 PROCEDURE — 83036 HEMOGLOBIN GLYCOSYLATED A1C: CPT

## 2024-03-21 PROCEDURE — 84443 ASSAY THYROID STIM HORMONE: CPT

## 2024-03-21 PROCEDURE — 85025 COMPLETE CBC W/AUTO DIFF WBC: CPT

## 2024-03-23 LAB — 1,25(OH)2D3 SERPL-MCNC: 57.7 PG/ML (ref 19.9–79.3)

## 2024-03-30 ENCOUNTER — DOCUMENTATION (OUTPATIENT)
Dept: PRIMARY CARE | Facility: CLINIC | Age: 19
End: 2024-03-30
Payer: COMMERCIAL

## 2024-03-30 DIAGNOSIS — F43.10 POST TRAUMATIC STRESS DISORDER: Primary | ICD-10-CM

## 2024-03-30 PROCEDURE — 99493 SBSQ PSYC COLLAB CARE MGMT: CPT | Performed by: FAMILY MEDICINE

## 2024-04-12 ENCOUNTER — SOCIAL WORK (OUTPATIENT)
Dept: PRIMARY CARE | Facility: CLINIC | Age: 19
End: 2024-04-12
Payer: COMMERCIAL

## 2024-04-12 NOTE — PROGRESS NOTES
[facility] ([facility])  Progress Note    Type of Interaction: In Office    Start Time: 8:02am    End Time: 8:58am    Appointment: Not Scheduled    Reason for Visit:   Chief Complaint   Patient presents with    Depression    Anxiety      Interval History / Patient Symptoms:     Stable, occ overwhelmed     Interventions Provided: [place] Setting, Psychoeducation, Acceptance & Commitment Therapy, Interpersonal Therapy, Motivational Interviewing, Strengths Exploration, Communication Training, Review Progress/Goals Stress Management, Mindfulness, and Treatment Planning    Progress Made: Significant     Response to Intervention: Patient shared that she accepted a different PT job at the [employer]. Reported that she is not starting until after her finals, making logical and mature choices. Patient reported that since working in her current position she finds difficulty finding empathy for those in grief. Reported that her partners grandfather is passing away, explored communication styles in order to increase empathy. Patient shared that she is getting closer with her mother, recalled negative experiences during her senior year. Communication wise, when irritated patient tends to respond aggressively. Reviewed healthier communication styles. Patient shared that she has met her mother's partners little girls, expressed this was a positive experience and enjoys being around them now. Was able to recognize positive things about her family growing in ways she originally was not accepting of. Patient shared that she continues to have a lack of appetite, eats about one meal away. Addressed the correlation between feeling tired and not eating enough nutrients, brando when patient is exercising 3-4x/week.     Plan: Continue to monitor mood, relapse prevention planning if stability continues.     Patient Instructions   Patient is scheduled for in person follow up on 4/30 @ 3pm    Follow Up / Next Appointment: Next  appointment: 04/30/24

## 2024-04-19 DIAGNOSIS — M25.50 ARTHRALGIA, UNSPECIFIED JOINT: Primary | ICD-10-CM

## 2024-04-22 ENCOUNTER — LAB (OUTPATIENT)
Dept: LAB | Facility: LAB | Age: 19
End: 2024-04-22
Payer: COMMERCIAL

## 2024-04-22 DIAGNOSIS — M25.50 ARTHRALGIA, UNSPECIFIED JOINT: ICD-10-CM

## 2024-04-22 LAB
ERYTHROCYTE [SEDIMENTATION RATE] IN BLOOD BY WESTERGREN METHOD: 7 MM/H (ref 0–20)
RHEUMATOID FACT SER NEPH-ACNC: <10 IU/ML (ref 0–15)
URATE SERPL-MCNC: 4.7 MG/DL (ref 2.3–6.7)

## 2024-04-22 PROCEDURE — 85652 RBC SED RATE AUTOMATED: CPT

## 2024-04-22 PROCEDURE — 84550 ASSAY OF BLOOD/URIC ACID: CPT

## 2024-04-22 PROCEDURE — 86431 RHEUMATOID FACTOR QUANT: CPT

## 2024-04-22 PROCEDURE — 36415 COLL VENOUS BLD VENIPUNCTURE: CPT

## 2024-04-22 PROCEDURE — 81381 HLA I TYPING 1 ALLELE HR: CPT

## 2024-04-22 PROCEDURE — 86038 ANTINUCLEAR ANTIBODIES: CPT

## 2024-04-23 LAB — ANA SER QL HEP2 SUBST: NEGATIVE

## 2024-04-26 LAB — HLAB27 TYPING: NEGATIVE

## 2024-04-30 ENCOUNTER — APPOINTMENT (OUTPATIENT)
Dept: PRIMARY CARE | Facility: CLINIC | Age: 19
End: 2024-04-30
Payer: COMMERCIAL

## 2024-04-30 PROCEDURE — 99493 SBSQ PSYC COLLAB CARE MGMT: CPT | Performed by: FAMILY MEDICINE

## 2024-05-01 ENCOUNTER — DOCUMENTATION (OUTPATIENT)
Dept: PRIMARY CARE | Facility: CLINIC | Age: 19
End: 2024-05-01
Payer: COMMERCIAL

## 2024-05-01 ENCOUNTER — TELEPHONE (OUTPATIENT)
Dept: PRIMARY CARE | Facility: CLINIC | Age: 19
End: 2024-05-01

## 2024-05-01 DIAGNOSIS — F43.10 POST TRAUMATIC STRESS DISORDER: Primary | ICD-10-CM

## 2024-05-21 ENCOUNTER — TELEPHONE (OUTPATIENT)
Dept: PRIMARY CARE | Facility: CLINIC | Age: 19
End: 2024-05-21

## 2024-05-21 ENCOUNTER — SOCIAL WORK (OUTPATIENT)
Dept: PRIMARY CARE | Facility: CLINIC | Age: 19
End: 2024-05-21
Payer: COMMERCIAL

## 2024-05-21 NOTE — PROGRESS NOTES
Patient endorsed feeling better and is curious about dropping her medication down to Sertraline 50mg.

## 2024-05-21 NOTE — PROGRESS NOTES
[facility] ([facility])  Progress Note    Type of Interaction: In Office    Start Time: 2:38pm    End Time: 3:22pm    Appointment: Not Scheduled    Reason for Visit:   Chief Complaint   Patient presents with    Depression    Anxiety      Interval History / Patient Symptoms:     Stable, improvement     Interventions Provided: [place] Setting, Psychoeducation, Interpersonal Therapy, Motivational Interviewing, Solution Focused Therapy, Strengths Exploration, Communication Training, Develop Coping Strategies, Review Progress/Goals Stress Management, Mindfulness, and Treatment Planning    Progress Made: Significant    Response to Intervention: Patient started her new job, very much enjoys the environment and work space. Shared she is working PRN at both jobs, previous and [employer]. Patient shared things between her and her partner are going well, communication improving. His grandfather passed away and she was able to provide empathy. Patient shared her mother is leaning on her to vent about her partner quite frequently. Reported that she is afriad to set healthier boundaries due to not wanting to hurt her feelings. Patient shared this is a problem she has with other people in her life as well. Reviewed ways to improve assertive boundaries in order to avoid burnout, which has been an issue in the past. Taking summer classes, encouraged a healthy work life balance. Writer encouraged nourishing meaningful relationships, moving body in a meaningful way, healthy balanced nutrition, a regular sleep schedule, and overall increased self-care to maintain mental health stability.      Plan: Assess mental health stability post titration down on Sertraline. At patient request, sent message to PCP about lowering dose due to improvement in mood with the weather. Relapse Prevention Planning if stability continues.     Patient Instructions   Patient is scheduled for in person follow up on 6/10 @ 9:30am    Follow Up / Next  Appointment: Next appointment: 07/10/24

## 2024-05-31 ENCOUNTER — DOCUMENTATION (OUTPATIENT)
Dept: PRIMARY CARE | Facility: CLINIC | Age: 19
End: 2024-05-31
Payer: COMMERCIAL

## 2024-05-31 DIAGNOSIS — F43.10 POST TRAUMATIC STRESS DISORDER: Primary | ICD-10-CM

## 2024-05-31 PROCEDURE — 99493 SBSQ PSYC COLLAB CARE MGMT: CPT | Performed by: FAMILY MEDICINE

## 2024-06-25 ENCOUNTER — HOSPITAL ENCOUNTER (OUTPATIENT)
Dept: RADIOLOGY | Facility: HOSPITAL | Age: 19
Discharge: HOME | End: 2024-06-25
Payer: COMMERCIAL

## 2024-06-25 ENCOUNTER — APPOINTMENT (OUTPATIENT)
Dept: PRIMARY CARE | Facility: CLINIC | Age: 19
End: 2024-06-25
Payer: COMMERCIAL

## 2024-06-25 VITALS
TEMPERATURE: 97.3 F | WEIGHT: 167.9 LBS | HEART RATE: 81 BPM | BODY MASS INDEX: 26.98 KG/M2 | OXYGEN SATURATION: 97 % | HEIGHT: 66 IN | SYSTOLIC BLOOD PRESSURE: 100 MMHG | RESPIRATION RATE: 16 BRPM | DIASTOLIC BLOOD PRESSURE: 63 MMHG

## 2024-06-25 DIAGNOSIS — M54.50 ACUTE RIGHT-SIDED LOW BACK PAIN WITHOUT SCIATICA: ICD-10-CM

## 2024-06-25 DIAGNOSIS — L30.9 ECZEMA, UNSPECIFIED TYPE: ICD-10-CM

## 2024-06-25 DIAGNOSIS — R29.898 RIGHT LEG WEAKNESS: ICD-10-CM

## 2024-06-25 DIAGNOSIS — F43.23 ADJUSTMENT REACTION WITH ANXIETY AND DEPRESSION: ICD-10-CM

## 2024-06-25 DIAGNOSIS — R79.0 LOW SERUM FERRITIN LEVEL: ICD-10-CM

## 2024-06-25 DIAGNOSIS — E03.9 HYPOTHYROIDISM, UNSPECIFIED TYPE: ICD-10-CM

## 2024-06-25 DIAGNOSIS — R00.2 PALPITATIONS: Primary | ICD-10-CM

## 2024-06-25 PROCEDURE — 72100 X-RAY EXAM L-S SPINE 2/3 VWS: CPT

## 2024-06-25 PROCEDURE — 99214 OFFICE O/P EST MOD 30 MIN: CPT | Performed by: FAMILY MEDICINE

## 2024-06-25 PROCEDURE — 93000 ELECTROCARDIOGRAM COMPLETE: CPT | Performed by: FAMILY MEDICINE

## 2024-06-25 PROCEDURE — 72100 X-RAY EXAM L-S SPINE 2/3 VWS: CPT | Performed by: RADIOLOGY

## 2024-06-25 PROCEDURE — 1036F TOBACCO NON-USER: CPT | Performed by: FAMILY MEDICINE

## 2024-06-25 RX ORDER — TIZANIDINE 4 MG/1
4 TABLET ORAL EVERY 6 HOURS PRN
Qty: 30 TABLET | Refills: 1 | Status: SHIPPED | OUTPATIENT
Start: 2024-06-25 | End: 2024-07-05

## 2024-06-25 RX ORDER — SERTRALINE HYDROCHLORIDE 50 MG/1
50 TABLET, FILM COATED ORAL DAILY
Qty: 90 TABLET | Refills: 1 | Status: SHIPPED | OUTPATIENT
Start: 2024-06-25 | End: 2024-12-22

## 2024-06-25 RX ORDER — ACETAMINOPHEN 500 MG
TABLET ORAL DAILY
COMMUNITY

## 2024-06-25 NOTE — PROGRESS NOTES
"Subjective   Patient ID: Julia Clements \"Dede\" is a 19 y.o. female who presents for Follow-up (Following up on zoloft and trazodone. Meds have been doing good no problems.), Back Pain (Lower back pain more toward right side has been going to chiropractor for 3 days a week then stopped going. They did an xray there and showed arthritis. Has been putting bio-freeze, icing it, stretching, heating pad and taking tylenol/ibuprofen helps but comes back again. ), and Palpitations (Went to ER on 6/2 for heart palpitations chest pain comes and goes. Wants heart to get checked out. ).  Covid vax: x 2  Flu: 10/23  Has had gardasil     Lmp: 6/16/24   HR  147  Exercises -no syncope  Occ happens w exercise  Stopped running  1-2 geo cups/d    HPI  Patient Active Problem List   Diagnosis    Eczema    Hypothyroidism    Low serum ferritin level       Past Surgical History:   Procedure Laterality Date    NO PAST SURGERIES         Review of Systems  This patient has  NO history of seizures/ CAD or CVA    NO history of recent Covid nor flu symptoms,  NO Fever nor chills,  NO Chest pain, shortness of breath nor paroxysmal nocturnal dyspnea,  NO Nausea, vomiting, nor diarrhea,  NO Hematochezia nor melena,  NO Dysuria, hematuria, nor new incontinence issues  NO new severe headaches nor neurological complaints,  NO new issues with anxiety nor depression nor new psychiatric complaints,  NO suicidal nor homicidal ideations.     OBJECTIVE:  /63 (BP Location: Left arm, Patient Position: Sitting, BP Cuff Size: Adult)   Pulse 81   Temp 36.3 °C (97.3 °F) (Temporal)   Resp 16   Ht 1.676 m (5' 6\")   Wt 76.2 kg (167 lb 14.4 oz)   SpO2 97%   BMI 27.10 kg/m²      General:  alert, oriented, no acute distress.  No obvious skin rashes noted.   No gait disturbance noted.    Mood is pleasant,  no signs of emotional distress.   Not appearing intoxicated or altered.   No voiced delusions,   Normal, appropriate behavior.    HEENT: " Normocephalic, atraumatic,   Pupils round, reactive to light  Extraocular motions intact and wnl  Tympanic membranes normal    Neck: no nuchal rigidity  No masses palpable.  No carotid bruits.  No thyromegaly.    Respiratory: Equal breath sounds  No wheezes,    rales,    nor rhonchi  No respiratory distress.    Heart: Regular rate and rhythm, no    murmurs  no rubs/gallops    Abdomen: no masses palpable, nontender, no rebound nor guarding.    Extremities: NO cyanosis noted, no clubbing.   No edema noted.  2+dorsalis pedis pulses.    Normal-not antalgic, steady gait.  No paraspinal pain to palpation    Lab on 04/22/2024   Component Date Value Ref Range Status    HLAB27 Typing 04/22/2024 Negative   Final    Uric Acid 04/22/2024 4.7  2.3 - 6.7 mg/dL Final    Venipuncture immediately after or during the administration of Metamizole may lead to falsely low results. Testing should be performed immediately  prior to Metamizole dosing.    Rheumatoid Factor 04/22/2024 <10  0 - 15 IU/mL Final    MABLE 04/22/2024 Negative  Negative Final    The Antinuclear Antibody (MABLE) test was performed using  indirect immunofluorescence assay with HEp-2 cells slide.    Sedimentation Rate 04/22/2024 7  0 - 20 mm/h Final        Assessment/Plan     Problem List Items Addressed This Visit       Eczema    Hypothyroidism    Relevant Medications    sertraline (Zoloft) 50 mg tablet    Low serum ferritin level    Relevant Medications    sertraline (Zoloft) 50 mg tablet     Other Visit Diagnoses       Palpitations    -  Primary    Relevant Orders    ECG 12 lead (Clinic Performed)    Holter or Event Cardiac Monitor    Referral to Cardiology    Adjustment reaction with anxiety and depression        Relevant Medications    sertraline (Zoloft) 50 mg tablet            Follow up at next scheduled visit -as planned  Increase fluids  To er if syncope or near syncope  Labs at er ok  Check EKG and holter  To cardio  She was NOT anxious  Per chiro pain in r back  and w exercise  No hx back injury  Will give muscle relaxer for bad nights  Tried IBU  This medications risks, benefits, and alternatives were discussed with patient at length.  If any unwanted side effects occur-discontinue medicine and call the office for discussion.  No etoh or driving on zanaflex  This medications risks, benefits, and alternatives were discussed with patient at length.  If any unwanted side effects occur-discontinue medicine and call the office for discussion.  6wks if pain needs mri

## 2024-06-26 ENCOUNTER — ANCILLARY PROCEDURE (OUTPATIENT)
Dept: CARDIOLOGY | Facility: CLINIC | Age: 19
End: 2024-06-26
Payer: COMMERCIAL

## 2024-06-26 DIAGNOSIS — R00.2 PALPITATIONS: ICD-10-CM

## 2024-06-26 PROCEDURE — 93227 XTRNL ECG REC<48 HR R&I: CPT | Performed by: INTERNAL MEDICINE

## 2024-06-26 PROCEDURE — 93225 XTRNL ECG REC<48 HRS REC: CPT | Performed by: INTERNAL MEDICINE

## 2024-07-10 ENCOUNTER — APPOINTMENT (OUTPATIENT)
Dept: PRIMARY CARE | Facility: CLINIC | Age: 19
End: 2024-07-10
Payer: COMMERCIAL

## 2024-07-10 NOTE — PROGRESS NOTES
Collaborative Care (CoCM)  Progress Note    Type of Interaction: In Office    Start Time: 9:28am    End Time: 10:20am    Appointment: Not Scheduled    Reason for Visit:   Chief Complaint   Patient presents with    Depression    Anxiety      Interval History / Patient Symptoms:     Stable, occ irritability     Interventions Provided: [place] Setting, Psychoeducation, Interpersonal Therapy, Motivational Interviewing, Solution Focused Therapy, Strengths Exploration, Values Exploration, Communication Training, Develop Coping Strategies, Review Progress/Goals Stress Management, Mindfulness, and Treatment Planning    Progress Made: Moderate    Response to Intervention: Patient shared that she has been experiencing increases in her blood pressure followed by quick drops, went to ED and is currently wearing a heart monitor. Is seeing cardio in a couple weeks. Patient does not feel this is anxiety related. Shared that she has been feeling well on her current dose of Sertraline, lower dose has helped with nausea. Continues to feel tired often. Explored nutritional habits, per patient, she continues to forget to eat. Problem solved ways to improve her nutritional habits to more of a balanced schedule. Recalled experiences on her cruise. Mom continues to have poor boundaries and guilt trips. Reviewed communication styles. Work and relationship going well. Writer encouraged nourishing meaningful relationships (brando time with close friends), moving body in a meaningful way (currently just doing strength training), healthy balanced nutrition, a regular sleep schedule (takes trazodone rarely, night shift weeks), and overall increased self-care to maintain mental health stability.      Plan: Bimonthly mental health support and monitoring     Patient Instructions   Patient is scheduled for follow up on 9/3 @ 11am    Follow Up / Next Appointment: Next appointment: 09/03/24

## 2024-07-31 ENCOUNTER — APPOINTMENT (OUTPATIENT)
Dept: CARDIOLOGY | Facility: CLINIC | Age: 19
End: 2024-07-31
Payer: COMMERCIAL

## 2024-07-31 ENCOUNTER — DOCUMENTATION (OUTPATIENT)
Dept: PRIMARY CARE | Facility: CLINIC | Age: 19
End: 2024-07-31

## 2024-07-31 VITALS
WEIGHT: 171.8 LBS | HEIGHT: 66 IN | DIASTOLIC BLOOD PRESSURE: 82 MMHG | BODY MASS INDEX: 27.61 KG/M2 | HEART RATE: 128 BPM | SYSTOLIC BLOOD PRESSURE: 118 MMHG

## 2024-07-31 DIAGNOSIS — R00.2 PALPITATIONS: ICD-10-CM

## 2024-07-31 DIAGNOSIS — F43.10 POST TRAUMATIC STRESS DISORDER: Primary | ICD-10-CM

## 2024-07-31 DIAGNOSIS — R94.31 ABNORMAL HOLTER EXAM: ICD-10-CM

## 2024-07-31 DIAGNOSIS — I47.11 INAPPROPRIATE SINUS TACHYCARDIA (CMS-HCC): ICD-10-CM

## 2024-07-31 DIAGNOSIS — E03.8 OTHER SPECIFIED HYPOTHYROIDISM: ICD-10-CM

## 2024-07-31 DIAGNOSIS — G43.001 MIGRAINE WITHOUT AURA AND WITH STATUS MIGRAINOSUS, NOT INTRACTABLE: ICD-10-CM

## 2024-07-31 PROBLEM — G43.909 MIGRAINE: Status: ACTIVE | Noted: 2024-07-31

## 2024-07-31 PROCEDURE — 3008F BODY MASS INDEX DOCD: CPT | Performed by: INTERNAL MEDICINE

## 2024-07-31 PROCEDURE — 99493 SBSQ PSYC COLLAB CARE MGMT: CPT | Performed by: FAMILY MEDICINE

## 2024-07-31 PROCEDURE — 99204 OFFICE O/P NEW MOD 45 MIN: CPT | Performed by: INTERNAL MEDICINE

## 2024-07-31 PROCEDURE — 1036F TOBACCO NON-USER: CPT | Performed by: INTERNAL MEDICINE

## 2024-07-31 RX ORDER — LANOLIN ALCOHOL/MO/W.PET/CERES
400 CREAM (GRAM) TOPICAL DAILY
Qty: 90 TABLET | Refills: 3 | Status: SHIPPED | OUTPATIENT
Start: 2024-07-31 | End: 2025-07-31

## 2024-07-31 RX ORDER — ACEBUTOLOL HYDROCHLORIDE 200 MG/1
200 CAPSULE ORAL 2 TIMES DAILY
Qty: 60 CAPSULE | Refills: 11 | Status: SHIPPED | OUTPATIENT
Start: 2024-07-31 | End: 2025-07-31

## 2024-07-31 RX ORDER — METOPROLOL SUCCINATE 25 MG/1
25 TABLET, EXTENDED RELEASE ORAL DAILY
Qty: 30 TABLET | Refills: 11 | Status: SHIPPED | OUTPATIENT
Start: 2024-07-31 | End: 2024-07-31 | Stop reason: ALTCHOICE

## 2024-07-31 NOTE — PATIENT INSTRUCTIONS
START Acebutolol 200MG TAKE ONE TAB TWICE DAILY  MAGNESIUM 400MG TAKE ONE TAB DAILY MAY INCREASE TO TWO TABS DAILY IF TOLERATED.    Please bring any lab results from other providers / physicians to your next appointment.     Please bring all medicines, vitamins, and herbal supplements with you when you come to the office.     Prescriptions will not be filled unless you are compliant with your follow up appointments or have a follow up appointment scheduled as per instruction of your physician. Refills should be requested at the time of your visit.    Scribe Attestation  By signing my name below, Dayami BRASWELL Scribe   attest that this documentation has been prepared under the direction and in the presence of Ethel Patel MD.

## 2024-09-03 ENCOUNTER — APPOINTMENT (OUTPATIENT)
Dept: PRIMARY CARE | Facility: CLINIC | Age: 19
End: 2024-09-03
Payer: COMMERCIAL

## 2024-09-05 ENCOUNTER — TELEPHONE (OUTPATIENT)
Dept: PRIMARY CARE | Facility: CLINIC | Age: 19
End: 2024-09-05
Payer: COMMERCIAL

## 2024-09-05 NOTE — PROGRESS NOTES
[facility] attempted to outreach patient via phone regarding rescheduling their cancelled/no show last appointment, LVM. To continue outreach.

## 2024-09-24 DIAGNOSIS — G43.809 OTHER MIGRAINE WITHOUT STATUS MIGRAINOSUS, NOT INTRACTABLE: Primary | ICD-10-CM

## 2024-09-24 RX ORDER — SUMATRIPTAN SUCCINATE 100 MG/1
TABLET ORAL
Qty: 9 TABLET | Refills: 0 | Status: SHIPPED | OUTPATIENT
Start: 2024-09-24

## 2024-10-02 ENCOUNTER — DOCUMENTATION (OUTPATIENT)
Dept: PRIMARY CARE | Facility: CLINIC | Age: 19
End: 2024-10-02
Payer: COMMERCIAL

## 2024-10-02 NOTE — PROGRESS NOTES
Writer has outreached pt in an attempt to reengage in Collaborative Care. Due to a lack of response to outreach, pt is being closed from Collaborative Care this time. If additional support needs arise, pt can be referred back to programming.     Patient last engaged in Collaborative Care sessions on  07/10/2024  Patient cancelled/no showed last appointment on  09/03/2024

## 2024-10-30 ENCOUNTER — APPOINTMENT (OUTPATIENT)
Dept: CARDIOLOGY | Facility: CLINIC | Age: 19
End: 2024-10-30
Payer: COMMERCIAL

## 2025-01-13 DIAGNOSIS — G43.809 OTHER MIGRAINE WITHOUT STATUS MIGRAINOSUS, NOT INTRACTABLE: ICD-10-CM

## 2025-01-13 RX ORDER — SUMATRIPTAN SUCCINATE 100 MG/1
TABLET ORAL
Qty: 9 TABLET | Refills: 0 | Status: SHIPPED | OUTPATIENT
Start: 2025-01-13

## 2025-03-18 ENCOUNTER — OFFICE VISIT (OUTPATIENT)
Dept: URGENT CARE | Age: 20
End: 2025-03-18
Payer: COMMERCIAL

## 2025-03-18 VITALS
WEIGHT: 160 LBS | RESPIRATION RATE: 18 BRPM | BODY MASS INDEX: 25.71 KG/M2 | OXYGEN SATURATION: 97 % | SYSTOLIC BLOOD PRESSURE: 109 MMHG | HEART RATE: 69 BPM | HEIGHT: 66 IN | TEMPERATURE: 97.8 F | DIASTOLIC BLOOD PRESSURE: 74 MMHG

## 2025-03-18 DIAGNOSIS — R30.0 DYSURIA: ICD-10-CM

## 2025-03-18 DIAGNOSIS — N30.00 ACUTE CYSTITIS WITHOUT HEMATURIA: Primary | ICD-10-CM

## 2025-03-18 DIAGNOSIS — Z20.2 POSSIBLE EXPOSURE TO STD: ICD-10-CM

## 2025-03-18 LAB
POC APPEARANCE, URINE: ABNORMAL
POC BILIRUBIN, URINE: NEGATIVE
POC BLOOD, URINE: NEGATIVE
POC COLOR, URINE: YELLOW
POC GLUCOSE, URINE: NEGATIVE MG/DL
POC KETONES, URINE: NEGATIVE MG/DL
POC LEUKOCYTES, URINE: NEGATIVE
POC NITRITE,URINE: NEGATIVE
POC PH, URINE: 6 PH
POC PROTEIN, URINE: NEGATIVE MG/DL
POC SPECIFIC GRAVITY, URINE: >=1.03
POC UROBILINOGEN, URINE: 0.2 EU/DL
PREGNANCY TEST URINE, POC: NEGATIVE

## 2025-03-18 PROCEDURE — 81025 URINE PREGNANCY TEST: CPT

## 2025-03-18 PROCEDURE — 3008F BODY MASS INDEX DOCD: CPT

## 2025-03-18 PROCEDURE — 1036F TOBACCO NON-USER: CPT

## 2025-03-18 PROCEDURE — 81003 URINALYSIS AUTO W/O SCOPE: CPT

## 2025-03-18 PROCEDURE — 99204 OFFICE O/P NEW MOD 45 MIN: CPT

## 2025-03-18 RX ORDER — METOPROLOL SUCCINATE 25 MG/1
12.5 TABLET, EXTENDED RELEASE ORAL DAILY
COMMUNITY

## 2025-03-18 RX ORDER — SULFAMETHOXAZOLE AND TRIMETHOPRIM 800; 160 MG/1; MG/1
1 TABLET ORAL 2 TIMES DAILY
Qty: 14 TABLET | Refills: 0 | Status: SHIPPED | OUTPATIENT
Start: 2025-03-18

## 2025-03-18 ASSESSMENT — ENCOUNTER SYMPTOMS
FEVER: 0
DIARRHEA: 0
VOMITING: 0
DYSURIA: 1
FLANK PAIN: 0
CARDIOVASCULAR NEGATIVE: 1
NAUSEA: 0
CONSTITUTIONAL NEGATIVE: 1
CHILLS: 0
HEMATURIA: 0
FREQUENCY: 1
RESPIRATORY NEGATIVE: 1
GASTROINTESTINAL NEGATIVE: 1

## 2025-03-18 ASSESSMENT — PAIN SCALES - GENERAL: PAINLEVEL_OUTOF10: 0-NO PAIN

## 2025-03-18 NOTE — PROGRESS NOTES
"Subjective   Patient ID: Julia Clements \"Leola" is a 19 y.o. female. They present today with a chief complaint of Female Dysuria (Concerns present x 3 days).    History of Present Illness  Subjective  Dede Clements is a 19 y.o. female who complains of burning with urination, frequency, and incomplete bladder emptying for 3 days. Patient does not have a history of recurrent UTI or pyelonephritis. Pt requesting testing for BV, yeast and STI.       Objective  /74   Pulse 69   Temp 36.6 °C (97.8 °F) (Oral)   Resp 18   Ht 1.676 m (5' 6\")   Wt 72.6 kg (160 lb)   LMP 03/14/2025 (Approximate)   SpO2 97%   BMI 25.82 kg/m²    General: alert and oriented, in no acute distress  Abdomen: soft, non-tender, without masses or organomegaly  Back: CVA tenderness absent    Laboratory:   Urine dipstick shows negative.    Micro exam: pending.    Assessment/Plan  Diagnoses and associated orders for this visit:    · Acute cystitis without hematuria   Urine Culture   Vaginitis Gram Stain For Bacterial Vaginosis + Yeast   Chlamydia trachomatis, Amplified, Urogenital   C. trachomatis / N. gonorrhoeae, Amplified, Urogenital   sulfamethoxazole-trimethoprim (Bactrim DS) 800-160 mg tablet; Take 1 tablet by mouth 2 times a day.    · Dysuria   POCT UA Automated manually resulted   POCT pregnancy, urine manually resulted   Urine Culture   Vaginitis Gram Stain For Bacterial Vaginosis + Yeast   Chlamydia trachomatis, Amplified, Urogenital   C. trachomatis / N. gonorrhoeae, Amplified, Urogenital   sulfamethoxazole-trimethoprim (Bactrim DS) 800-160 mg tablet; Take 1 tablet by mouth 2 times a day.    · Possible exposure to STD            History provided by:  Patient and medical records      Past Medical History  Allergies as of 03/18/2025    (No Known Allergies)       (Not in a hospital admission)       History reviewed. No pertinent past medical history.    Past Surgical History:   Procedure Laterality Date    NO PAST " "SURGERIES          reports that she has never smoked. She has never used smokeless tobacco. She reports current alcohol use. She reports that she does not use drugs.    Review of Systems  Review of Systems   Constitutional: Negative.  Negative for chills and fever.   HENT: Negative.     Respiratory: Negative.     Cardiovascular: Negative.    Gastrointestinal: Negative.  Negative for diarrhea, nausea and vomiting.   Genitourinary:  Positive for dysuria, frequency and urgency. Negative for flank pain and hematuria.   All other systems reviewed and are negative.                                 Objective    Vitals:    03/18/25 0847   BP: 109/74   Pulse: 69   Resp: 18   Temp: 36.6 °C (97.8 °F)   TempSrc: Oral   SpO2: 97%   Weight: 72.6 kg (160 lb)   Height: 1.676 m (5' 6\")     Patient's last menstrual period was 03/14/2025 (approximate).    Physical Exam  Vitals and nursing note reviewed.   Constitutional:       Appearance: She is not ill-appearing or toxic-appearing.   HENT:      Head: Atraumatic.   Cardiovascular:      Rate and Rhythm: Normal rate and regular rhythm.      Pulses: Normal pulses.      Heart sounds: Normal heart sounds.   Pulmonary:      Effort: Pulmonary effort is normal.      Breath sounds: Normal breath sounds.   Abdominal:      General: Abdomen is flat. Bowel sounds are normal.      Palpations: Abdomen is soft.      Tenderness: There is no right CVA tenderness or left CVA tenderness.   Skin:     General: Skin is warm and dry.      Capillary Refill: Capillary refill takes less than 2 seconds.   Neurological:      Mental Status: She is alert and oriented to person, place, and time.   Psychiatric:         Behavior: Behavior normal.         Procedures    Point of Care Test & Imaging Results from this visit  Results for orders placed or performed in visit on 03/18/25   POCT UA Automated manually resulted   Result Value Ref Range    POC Color, Urine Yellow Straw, Yellow, Light-Yellow    POC Appearance, " Urine Cloudy (A) Clear    POC Glucose, Urine NEGATIVE NEGATIVE mg/dl    POC Bilirubin, Urine NEGATIVE NEGATIVE    POC Ketones, Urine NEGATIVE NEGATIVE mg/dl    POC Specific Gravity, Urine >=1.030 1.005 - 1.035    POC Blood, Urine NEGATIVE NEGATIVE    POC PH, Urine 6.0 No Reference Range Established PH    POC Protein, Urine NEGATIVE NEGATIVE mg/dl    POC Urobilinogen, Urine 0.2 0.2, 1.0 EU/DL    Poc Nitrite, Urine NEGATIVE NEGATIVE    POC Leukocytes, Urine NEGATIVE NEGATIVE   POCT pregnancy, urine manually resulted   Result Value Ref Range    Preg Test, Ur Negative Negative      No results found.    Diagnostic study results (if any) were reviewed by CARLOS Mathews.    Assessment/Plan   Allergies, medications, history, and pertinent labs/EKGs/Imaging reviewed by CARLOS Mathews.     Medical Decision Making  Risks, benefits, and alternatives of the medications and treatment plan prescribed today were discussed, and patient expressed understanding. Plan follow up as discussed or as needed if any worsening symptoms or change in condition. Reinforced red flags including (but not limited to): severe or worsening pain; difficulty swallowing; stiff neck; shortness of breath; coughing or vomiting blood; chest pain; and new or increased fever are indications to go to the Emergency Department.  At time of discharge patient was clinically well-appearing and HDS for outpatient management. The patient and/or family was educated regarding diagnosis, supportive care, OTC and Rx medications. The patient and/or family was given the opportunity to ask questions prior to discharge.  They verbalized understanding of my discussion of the plans for treatment, expected course, indications to return to  or seek further evaluation in ED, and the need for timely follow up as directed.   They were provided with a work/school excuse if requested. The after-visit summary was given to the patient and care instructions  were reviewed with the patient. All questions were answered and the patient verbalized understanding of the plan of care for today.  Plan:  Recent visit notes reviewed  Meds as above  Increase clear fluids  Pcp follow up this week if not improving or worsening  ER visit anytime 24/7 for acute worsening or changing condition      Orders and Diagnoses  Diagnoses and all orders for this visit:  Acute cystitis without hematuria  -     Urine Culture  -     Vaginitis Gram Stain For Bacterial Vaginosis + Yeast  -     Chlamydia trachomatis, Amplified, Urogenital  -     C. trachomatis / N. gonorrhoeae, Amplified, Urogenital  -     sulfamethoxazole-trimethoprim (Bactrim DS) 800-160 mg tablet; Take 1 tablet by mouth 2 times a day.  Dysuria  -     POCT UA Automated manually resulted  -     POCT pregnancy, urine manually resulted  -     Urine Culture  -     Vaginitis Gram Stain For Bacterial Vaginosis + Yeast  -     Chlamydia trachomatis, Amplified, Urogenital  -     C. trachomatis / N. gonorrhoeae, Amplified, Urogenital  -     sulfamethoxazole-trimethoprim (Bactrim DS) 800-160 mg tablet; Take 1 tablet by mouth 2 times a day.  Possible exposure to STD      Medical Admin Record      Patient disposition: Home    Electronically signed by CARLOS Mathews  12:30 PM

## 2025-03-19 LAB
BV SCORE VAG QL: NORMAL
C TRACH RRNA SPEC QL NAA+PROBE: NOT DETECTED
N GONORRHOEA RRNA SPEC QL NAA+PROBE: NOT DETECTED
QUEST GC CT AMPLIFIED (ALWAYS MESSAGE): NORMAL

## 2025-03-21 ENCOUNTER — DOCUMENTATION (OUTPATIENT)
Dept: URGENT CARE | Age: 20
End: 2025-03-21

## 2025-03-21 LAB — BACTERIA UR CULT: ABNORMAL

## 2025-03-21 NOTE — PROGRESS NOTES
Patient's urine culture returned with S. Epidermis.  Prescribed Bactrim and is susceptible according to sensitivity.  Patient notified.

## 2025-03-21 NOTE — PROGRESS NOTES
Called and left message on machine for patient to give the office a call back to discuss results.

## 2025-09-24 ENCOUNTER — APPOINTMENT (OUTPATIENT)
Dept: PRIMARY CARE | Facility: CLINIC | Age: 20
End: 2025-09-24
Payer: COMMERCIAL